# Patient Record
Sex: MALE | Race: WHITE | NOT HISPANIC OR LATINO | Employment: FULL TIME | URBAN - METROPOLITAN AREA
[De-identification: names, ages, dates, MRNs, and addresses within clinical notes are randomized per-mention and may not be internally consistent; named-entity substitution may affect disease eponyms.]

---

## 2024-05-28 ENCOUNTER — ANCILLARY PROCEDURE (OUTPATIENT)
Dept: RADIOLOGY | Facility: CLINIC | Age: 34
End: 2024-05-28
Payer: COMMERCIAL

## 2024-05-28 ENCOUNTER — NURSE TRIAGE (OUTPATIENT)
Dept: FAMILY MEDICINE | Facility: CLINIC | Age: 34
End: 2024-05-28

## 2024-05-28 DIAGNOSIS — R69 DIAGNOSIS UNKNOWN: Primary | ICD-10-CM

## 2024-05-28 NOTE — TELEPHONE ENCOUNTER
"Pt calling because early this morning around 1-2am he was having sever left flank pain.  States that he was on the floor with pain will 5am when it went away.  States that he took tylenol around 2am but did not really help.  Pt states that he has been talking to his MD friends and believe that it is a kidney stone.    Pt wants to get in today to see a provider for imaging.    Relayed to pt that there are no in clinics at ,  or  today.  Pt understands and states that he will most likely walk in to .    Pt said he will call back to schedule a visit is needed.    Understands to be seen in the ED of the severe pain returns.        Reason for Disposition   Patient wants to be seen    Additional Information   Negative: Passed out (i.e., lost consciousness, collapsed and was not responding)   Negative: Shock suspected (e.g., cold/pale/clammy skin, too weak to stand, low BP, rapid pulse)   Negative: Sounds like a life-threatening emergency to the triager   Negative: Followed a major injury to the back (e.g., MVA, fall > 10 feet or 3 meters, penetrating injury, etc.)   Negative: Upper, mid or lower back pain that occurs mainly in the midline   Negative: SEVERE pain (e.g., excruciating, scale 8-10) and present > 1 hour   Negative: Sudden onset of severe flank pain and age > 60 years   Negative: Abdominal pain and age > 60 years   Negative: Unable to urinate (or only a few drops) > 4 hours and bladder feels very full (e.g., palpable bladder or strong urge to urinate)   Negative: Pain radiates into groin, scrotum   Negative: Blood in urine (red, pink, or tea-colored)   Negative: Vomiting   Negative: Weakness of a leg or foot (e.g., unable to bear weight, dragging foot)   Negative: Patient sounds very sick or weak to the triager   Negative: Fever > 100.4 F (38.0 C)   Negative: Pain or burning with passing urine (urination)    Answer Assessment - Initial Assessment Questions  1. LOCATION: \"Where does it hurt?\" (e.g., " "left, right)      Left side  2. ONSET: \"When did the pain start?\"      Very early this morning around 1-2am.  3. SEVERITY: \"How bad is the pain?\" (e.g., Scale 1-10; mild, moderate, or severe)    - MILD (1-3): doesn't interfere with normal activities     - MODERATE (4-7): interferes with normal activities or awakens from sleep     - SEVERE (8-10): excruciating pain and patient unable to do normal activities (stays in bed)        Was severe. Laying on the floor.   4. PATTERN: \"Does the pain come and go, or is it constant?\"       Pt is currently gone since 5am.   5. CAUSE: \"What do you think is causing the pain?\"      Thinks it is a kidney stone  6. OTHER SYMPTOMS:  \"Do you have any other symptoms?\" (e.g., fever, abdomen pain, vomiting, leg weakness, burning with urination, blood in urine)      None. Currently feels fine.  7. PREGNANCY:  \"Is there any chance you are pregnant?\" \"When was your last menstrual period?\"      *No Answer*    Protocols used: Flank Pain-A-[facility]        JOHNATHAN PoolN, RN  [facility]    "

## 2024-05-29 ENCOUNTER — VIRTUAL VISIT (OUTPATIENT)
Dept: UROLOGY | Facility: CLINIC | Age: 34
End: 2024-05-29
Payer: COMMERCIAL

## 2024-05-29 ENCOUNTER — TELEPHONE (OUTPATIENT)
Dept: UROLOGY | Facility: CLINIC | Age: 34
End: 2024-05-29

## 2024-05-29 DIAGNOSIS — N20.1 LEFT URETERAL STONE: Primary | ICD-10-CM

## 2024-05-29 DIAGNOSIS — N20.0 NEPHROLITHIASIS: ICD-10-CM

## 2024-05-29 DIAGNOSIS — N20.1 CALCULUS OF URETEROVESICAL JUNCTION (UVJ): ICD-10-CM

## 2024-05-29 PROCEDURE — 99443 PR PHYSICIAN TELEPHONE EVALUATION 21-30 MIN: CPT | Performed by: NURSE PRACTITIONER

## 2024-05-29 RX ORDER — TROSPIUM CHLORIDE 20 MG/1
20 TABLET, FILM COATED ORAL 2 TIMES DAILY
Qty: 30 TABLET | Refills: 3 | Status: SHIPPED | OUTPATIENT
Start: 2024-05-29

## 2024-05-29 RX ORDER — OXYCODONE HYDROCHLORIDE 5 MG/1
5 TABLET ORAL
COMMUNITY
Start: 2024-05-28

## 2024-05-29 RX ORDER — TAMSULOSIN HYDROCHLORIDE 0.4 MG/1
0.4 CAPSULE ORAL DAILY
Qty: 30 CAPSULE | Refills: 0 | Status: SHIPPED | OUTPATIENT
Start: 2024-05-29

## 2024-05-29 RX ORDER — TROSPIUM CHLORIDE 20 MG/1
20 TABLET, FILM COATED ORAL
COMMUNITY
Start: 2024-02-22 | End: 2024-05-29

## 2024-05-29 RX ORDER — TAMSULOSIN HYDROCHLORIDE 0.4 MG/1
0.4 CAPSULE ORAL
COMMUNITY
Start: 2024-05-28 | End: 2024-06-11

## 2024-05-29 RX ORDER — OXYBUTYNIN CHLORIDE 5 MG/1
5 TABLET, EXTENDED RELEASE ORAL DAILY
Qty: 30 TABLET | Refills: 0 | Status: SHIPPED | OUTPATIENT
Start: 2024-05-29

## 2024-05-29 RX ORDER — ONDANSETRON 4 MG/1
4 TABLET, ORALLY DISINTEGRATING ORAL
COMMUNITY
Start: 2024-05-28

## 2024-05-29 NOTE — PROGRESS NOTES
Urology Telephonic Office Visit    Telephonic-Visit Details    Type of service:  Telephonic Visit    Telephonic Start Time: 1308    Telephonic End Time: 1324    Originating Location (pt. Location): Home    Distant Location (provider location):  Off-site     Platform used for Telephonic Visit: [employer]           Assessment and Plan:     Assessment:33 year old male with a left 3mm UVJ stone    Plan:  -Reviewed CT scan with patient. Noted left 3mm UVJ stone. Noted nonobstructing right renal stones.   -The patient and I discussed the diagnosis and natural history of urolithiasis. Discussed future visit for stone prevention management.   -We discussed treatment options including observation with MET vs definitve stone procedure. Pt would like to do MET x 3-4 weeks.   -Please use acetaminophen, ibuprofen, dimenhydrinate, and oxycodone PRN for pain control. Please continue with tamsulosin 0.4mg PO daily to help with stone passage. Will start on oxybutynin to help with bladder spasms. Discussed r/b/a of medication with patient.   -If having severe flank pain, fevers, chills, nausea, or vomiting please notify Urology clinic or be seen in the ER.   -RTC in 4 weeks with CT scan.     Monet Harris CNP  Department of Urology  May 29, 2024    I spent a total of 35 minutes spent on the date of the encounter doing chart review, history and exam, documentation, and further activities as noted above.          Chief Complaint:   Left UVJ Stone         History of Present Illness:    Bakari Helms is a pleasant 33 year old male who presents with concerns of a left UVJ stone.     Mr. Helms was [facility] with [facility]24 for concerns of left flank pain with urinary hesitancy and gross hematuria.     CT scan performed on 5/28/24 (images personally reviewed) revealed a left 3mm UVJ stone. Noted right nonobstructing renal stones. No noted hydronephrosis.     He is doing well at this time. Denies any f/c/n/v, gross  hematuria, or dysuria.     This is his first kidney stone. No known family history of nephrolithiasis.     Stone Risk Factors: None    He notes he has a history of an overactive bladder and will use trospium to help with his symptom. He notes he has had a bladder scan in the past which didn't reveal concerns of urinary retention.     He works as a  and currently is in a court at this time. Notes poor fluid intake with poor diet consisting of high sodium snacks.         Past Medical History:   No past medical history on file.         Past Surgical History:   No past surgical history on file.         Medications     Current Outpatient Medications   Medication Sig Dispense Refill    ondansetron (ZOFRAN ODT) 4 MG ODT tab Place 4 mg under the tongue      oxyCODONE (ROXICODONE) 5 MG tablet Take 5 mg by mouth      tamsulosin (FLOMAX) 0.4 MG capsule Take 0.4 mg by mouth      trospium (SANCTURA) 20 MG tablet Take 20 mg by mouth       No current facility-administered medications for this visit.            Family History:   No family history on file.         Social History:     Social History     Socioeconomic History    Marital status:      Spouse name: Not on file    Number of children: Not on file    Years of education: Not on file    Highest education level: Not on file   Occupational History    Not on file   Tobacco Use    Smoking status: Not on file    Smokeless tobacco: Not on file   Substance and Sexual Activity    Alcohol use: Not on file    Drug use: Not on file    Sexual activity: Not on file   Other Topics Concern    Not on file   Social History Narrative    Not on file     Social Determinants of Health     Financial Resource Strain: Not on file   Food Insecurity: Not on file   Transportation Needs: Not on file   Physical Activity: Not on file   Stress: Not on file   Social Connections: Not on file   Interpersonal Safety: Not on file   Housing Stability: Not on file            Allergies:   Patient has  no known allergies.         Review of Systems:  From intake questionnaire   Negative 14 system review except as noted on HPI, nurse's note.         Physical Exam:   General Appearance: Well groomed, hygenic  Eyes: No redness, discharge  Respiratory: No cough, no respiratory distress or labored breathing  Musculoskeletal: Grossly normal, full range of motion in upper extremities, no gross deficits  Skin: No discoloration or apparent rashes  Neurologic - No tremors  Psychiatric - Alert and oriented  The rest of a comprehensive physical examination is deferred due to video visit restrictions        Labs:    I personally reviewed all applicable laboratory data and went over findings with patient  Significant for:    UA RESULTS:        [employer] [facility]  Outside Information         suggestion  Information displayed in this report may not trend or trigger automated decision support.       Contains abnormal data UA W/ SEDIMENT EXAM REFLEXED PER CRITERIA  Order: [id number]  Component  Ref Range & Units 1 d ago   COLOR                      Yellow Color Yellow   CLARITY                    Clear Clarity Clear   SPECIFIC GRAVITY,URINE    1.010, 1.015, 1.020, 1.025                 1.025   PH,URINE                  6.0, 7.0, 8.0, 5.5, 6.5, 7.5, 8.5                 7.0   UROBILINOGEN,QUALITATIVE  Normal EU/dl Normal   PROTEIN, URINE  Negative mg/dL Trace Abnormal    GLUCOSE, URINE  Negative mg/dL Negative   KETONES,URINE              Negative mg/dL Negative   BILIRUBIN,URINE            Negative                 Negative   OCCULT BLOOD,URINE        Negative                 Moderate Abnormal    NITRITE                    Negative                 Negative   LEUKOCYTE ESTERASE        Negative                 Negative   [facility]     Specimen Collected: 05/28/24 12:34 PM    Performed by: [facility] Last Resulted: 05/28/24 12:40 PM   [facility]  [facility]  Result Received: 05/29/24  8:10 AM    View Encounter          Received Information      Result Report    UA W/ SEDIMENT EXAM REFLEXED PER CRITERIA (Order #[id number]) on 5/28/24       Order Report    UA W/ SEDIMENT EXAM REFLEXED PER CRITERIA (Order #[id number]) on 5/28/24    Reprint Order Requisition    UA W/ SEDIMENT EXAM REFLEXED PER CRITERIA (Order #[id number]) on 5/28/24      Tracking Links    Cosign Tracking Order Transmittal Tracking          [employer] [facility]  Outside Information         suggestion  Information displayed in this report may not trend or trigger automated decision support.       Contains abnormal data URINALYSIS MICROSCOPIC  Order: [id number]  Component  Ref Range & Units 1 d ago   RBC  0-2, None Seen /HPF 11-25 Abnormal    WBC  0-2, 3-5, None Seen /HPF 0-2   BACTERIA                  None Seen, Few Bacteria/HPF None Seen   EPITHELIAL CELLS          None Seen, Few Epi/HPF None Seen   OTHER Mucus Present   Resulting Agency [facility]     Specimen Collected: 05/28/24 12:34 PM    Performed by: [facility] Last Resulted: 05/28/24 12:55 PM   [facility]  Result Received: 05/29/24  8:10 AM    View Encounter          Received Information      Result Report    URINALYSIS MICROSCOPIC (Order #[id number]) on 5/28/24       Order Report    URINALYSIS MICROSCOPIC (Order #[id number]) on 5/28/24    Reprint Order Requisition    URINALYSIS MICROSCOPIC (Order #[id number]) on 5/28/24      Tracking Links    Cosign Tracking Order Transmittal Tracking       Imaging:    I personally reviewed all applicable imaging and went over the below findings with patient.    CT Abdomen Pelvis w/o Contrast  Order: [id number]  Impression      1.  Radiopaque 3 mm calculus distal left ureter. No associated hydronephrosis or hydroureter.  2.  Three additional nonobstructing calculi in right renal calyces.  Narrative    For  Patients: As a result of the 21st Century Cures Act, medical imaging exams and procedure reports are released immediately into your electronic medical record. You may view this report before your referring provider. If you have questions, please contact your health care provider.    EXAM: CT ABDOMEN PELVIS STONE PROTOCOL WO  LOCATION: [facility] [place]  DATE: 5/28/2024    INDICATION: Left flank pain, kidney stone suspected  COMPARISON: None.  TECHNIQUE: CT scan of the abdomen and pelvis was performed without oral or IV contrast. Multiplanar reformats were obtained. Dose reduction techniques were used.  CONTRAST: None.    FINDINGS:  LOWER CHEST: Normal.    HEPATOBILIARY: Normal.    PANCREAS: Normal.    SPLEEN: Normal.    ADRENAL GLANDS: Normal.    KIDNEY/BLADDER: Three small radiopaque calculi are present in the right cortical medullary junction with the 2 larger calculi measuring 2-3 mm. No left nephrolithiasis. There is a 3 mm radiopaque calculi in the distal left ureter within a centimeter of the ureterovesicular junction. There is no left hydronephrosis or hydroureter. Urinary bladder is decompressed.    BOWEL: No obstruction or inflammatory change. There is a circumscribed focus of increased attenuation including a small calcification in the pericolonic fat in the posterior left pelvis, likely related to remote prior instance of fat necrosis.    LYMPH NODES: Normal.    VASCULATURE: Normal.    PELVIC ORGANS: Prostate gland is normal in size. Seminal vesicles are symmetric. No pelvic free fluid.    MUSCULOSKELETAL: Normal.  Exam End: 05/28/24 12:58 PM    Specimen Collected: 05/28/24 12:58 PM Last Resulted: 05/28/24  1:20 PM   [facility]: [employer] [facility]  Result Received: 05/29/24  8:10 AM

## 2024-05-29 NOTE — PATIENT INSTRUCTIONS
UROLOGY CLINIC VISIT PATIENT INSTRUCTIONS    -If having severe flank pain, fevers, chills, nausea, or vomiting please notify Urology clinic or be seen in the ER.     If you have any issues, questions or concerns in the meantime, do not hesitate to contact us at [facility] at [phone] or via [employer].     Monet Harris CNP  Department of Urology

## 2024-06-18 ENCOUNTER — VIRTUAL VISIT (OUTPATIENT)
Dept: UROLOGY | Facility: CLINIC | Age: 34
End: 2024-06-18
Payer: COMMERCIAL

## 2024-06-18 DIAGNOSIS — N20.0 NEPHROLITHIASIS: Primary | ICD-10-CM

## 2024-06-18 PROCEDURE — 99203 OFFICE O/P NEW LOW 30 MIN: CPT | Mod: 95 | Performed by: NURSE PRACTITIONER

## 2024-06-18 ASSESSMENT — PAIN SCALES - GENERAL: PAINLEVEL: NO PAIN (0)

## 2024-06-18 NOTE — PATIENT INSTRUCTIONS
"UROLOGY CLINIC VISIT PATIENT INSTRUCTIONS    If you have any issues, questions or concerns in the meantime, do not hesitate to contact us at [facility] at [phone] or via [employer].     Monet Harris CNP  Department of Urology     DIET & LIFESTYLE CHANGES FOR PATIENTS WITH KIDNEY STONES    If you've had a kidney stone, you are likely to form another. Risk of recurrence is 15% at 1 year, 35% to 40% at 2 years, and 50% at 10 years. Therefore, prevention is very important.  These recommendations have shown to be effective.    CALCIUM STONES (Oxalate and Phosphate)    Fluid intake is the most important prevention measure to help prevent stones. Fluid intake should be at least 2.5 liters per day or 90-120oz per day. With goal of urine output of >2.5L per day.   Increasing liquids that have citric acid may help such as low calorie orange juice, lemonade (Crystal Light Lemonade or True Lemon/Lime), or adding a citrus to your water.  You can add lemon juice or fresh pete to your water daily.  Lemon juice increases the citrate in your urine, and helps decrease the formation of stone and even breakdown certain types of stones. Add a cap full/teaspoon of pure lemon juice to each glass.   Try to limit sugar, especially if you have diabetes.    Helpful Fluid Measurements:  1 liter = 34oz  1 quart = 32 oz  24 pack water: Each bottle 16.9 oz     Low Oxalate Diet: Limit your consumption of OXALATE-rich foods including:  All nuts and nut products including peanuts, almonds,peanut butter, almond milk  Spinach  Rhubarb  Beets  Chocolate  Soybeans and soy products   Wheat Germ    Website:   www.kidneysViryd Technologiesediet.com  Below is a link to a PDF that is based on Veset research. Please stick to pages 6-9 of this document. My suggestion is to review the list of food that is OK. The \"avoid\" list can be " overwhelming.  [email or web address]_[email or web address]_[email or web address]_eid=[email or web address]        Low Sodium Diet: Salt (sodium chloride) is found in abundance in many foods. High sodium levels in the urine can interfere with the kidney's handling of calcium.   Trying a DASH (Dietary Approaches to Stop Hypertension) diet which is eating more fruits and vegetables, limiting salt intake, moderate in low-fat diary products, and low in animal protein.   Try to decrease salt intake to <2000 mg of sodium daily.     Tips for reducing the salt in your diet:  Don't use salt at the table  Reduce the salt used in food preparation. Try 1/2 teaspoon when recipes call for 1 teaspoon.  Use herbs and spices for flavoring instead of salt.  Avoid salty foods.  Check the label before you buy or use a product. Note sodium and portion size information.  Try to consume less than 2,000 mg/day. (1 teaspoon = 2,000 mg)    Foods with high sodium content include:  Processed meat (including luncheon meats, sausage)   Crackers   Instant cereal   Processed cheese   Canned soups   Chips and snack foods   Soy sauce    Low Animal Protein: Reduce animal protein (meat) intake to no more than 6-8 ounces or less than 50 grams per day.     Maintain a normal calcium diet: Calcium rich foods are encouraged, but no more than 1000 - 1200 mg per day. Researches have found that people with low calcium intakes tend to have more stones. Foods with high calcium content are acceptable and include:  Calcium rich foods include:   Diary (cheese, milk, and yogurt)  Enriched cereals  Meat and fish  Dark green vegetables    Limit Vitamin C intake to < 1000 mg daily.    Consultation with a dietician may be helpful as well.  Please let our staff know if you are interested in this helpful option so a consult may be placed for you.

## 2024-06-18 NOTE — PROGRESS NOTES
Urology Telephonic Office Visit    Telephonic-Visit Details    Type of service:  Telephonic Visit    Telephonic Start Time: 0956    Telephonic End Time: 1015    Originating Location (pt. Location): Home    Distant Location (provider location):  Off-site     Platform used for Telephonic Visit: [facility]           Assessment and Plan:     Assessment:33 year old male with nephrolithiasis    Plan:  -Discussed option of repeat imaging to ensure stone passage. Pt deferred at this time.   -Discussed option of 24 hour urine study for further evaluation for risks of stones. Pt amenable to plan of care. Will send litholink kit x 1.   -We discussed some general measures to prevent recurrent kidney stones.  These include fluid intake to achieve 2.5 liters of urine per day, decreased salt intake, normal calcium intake, lowering animal protein intake, avoiding high amounts of oxalate containing foods, and increased citrate intake with orange juice/lemonade.   -Discussed option of further evaluation of LUTS with possible need bladder scan/urodynamic studies/PFPT. Will continue on trospium at this time. If symptoms worsen or become more bothersome, recommend to notify Urology clinic.   -RTC in 6-8 weeks to review 24 hour urine study.      Monet Harris CNP  Department of Urology  June 18, 2024    I spent a total of 25 minutes spent on the date of the encounter doing chart review, history and exam, documentation, and further activities as noted above.          Chief Complaint:   Left Ureteral Stone         History of Present Illness:    Bakari Helms is a pleasant 33 year old male who presents for follow up of a left ureteral stone.     He was able to pass his stone on his own. He was unable to retrieve his stone. He notes with passage he noted possibly two stones vs one.     He is doing well at this time. Denies any s/s of an acute stone.     He was initially seen with [facility] on 5/28/24 for concerns of left flank  pain with urinary hesitancy and gross hematuria.      CT scan performed on 5/28/24 (images personally reviewed) revealed a left 3mm UVJ stone. Noted two right  2-3mm nonobstructing renal stones. No noted hydronephrosis.      This was his first kidney stone episode. No known family history of nephrolithiasis.      Stone Risk Factors: None     He notes he has a history of an overactive bladder and will use trospium to help with his symptom. He notes he has had a bladder scan in the past which didn't reveal concerns of urinary retention.     He works as a  and currently is in a court at this time. Notes poor fluid intake with poor diet consisting of high sodium snacks for last 6 months    Does drink chocolate whey protein shakes one time per day.         Past Medical History:   No past medical history on file.         Past Surgical History:   No past surgical history on file.         Medications     Current Outpatient Medications   Medication Sig Dispense Refill    ondansetron (ZOFRAN ODT) 4 MG ODT tab Place 4 mg under the tongue      oxyBUTYnin ER (DITROPAN XL) 5 MG 24 hr tablet Take 1 tablet (5 mg) by mouth daily 30 tablet 0    oxyCODONE (ROXICODONE) 5 MG tablet Take 5 mg by mouth      tamsulosin (FLOMAX) 0.4 MG capsule Take 1 capsule (0.4 mg) by mouth daily 30 capsule 0    trospium (SANCTURA) 20 MG tablet Take 1 tablet (20 mg) by mouth 2 times daily 30 tablet 3     No current facility-administered medications for this visit.            Family History:   No family history on file.         Social History:     Social History     Socioeconomic History    Marital status:      Spouse name: Not on file    Number of children: Not on file    Years of education: Not on file    Highest education level: Not on file   Occupational History    Not on file   Tobacco Use    Smoking status: Not on file    Smokeless tobacco: Not on file   Substance and Sexual Activity    Alcohol use: Not on file    Drug use: Not on file     Sexual activity: Not on file   Other Topics Concern    Not on file   Social History Narrative    Not on file     Social Determinants of Health     Financial Resource Strain: Not on file   Food Insecurity: Not on file   Transportation Needs: Not on file   Physical Activity: Not on file   Stress: Not on file   Social Connections: Not on file   Interpersonal Safety: Not on file   Housing Stability: Not on file            Allergies:   Patient has no known allergies.         Review of Systems:  From intake questionnaire   Negative 14 system review except as noted on HPI, nurse's note.         Physical Exam:     The rest of a comprehensive physical examination is deferred due to telephonic visit restrictions      Imaging:    I personally reviewed all applicable imaging and went over the below findings with patient.    CT Abdomen Pelvis w/o Contrast  Order: [id number]  Impression      1.  Radiopaque 3 mm calculus distal left ureter. No associated hydronephrosis or hydroureter.  2.  Three additional nonobstructing calculi in right renal calyces.  Narrative    For Patients: As a result of the 21st Century Cures Act, medical imaging exams and procedure reports are released immediately into your electronic medical record. You may view this report before your referring provider. If you have questions, please contact your health care provider.    EXAM: CT ABDOMEN PELVIS STONE PROTOCOL WO  LOCATION: [facility] [place]  DATE: 5/28/2024    INDICATION: Left flank pain, kidney stone suspected  COMPARISON: None.  TECHNIQUE: CT scan of the abdomen and pelvis was performed without oral or IV contrast. Multiplanar reformats were obtained. Dose reduction techniques were used.  CONTRAST: None.    FINDINGS:  LOWER CHEST: Normal.    HEPATOBILIARY: Normal.    PANCREAS: Normal.    SPLEEN: Normal.    ADRENAL GLANDS: Normal.    KIDNEY/BLADDER: Three small radiopaque calculi are present in the right cortical medullary junction with the 2  larger calculi measuring 2-3 mm. No left nephrolithiasis. There is a 3 mm radiopaque calculi in the distal left ureter within a centimeter of the ureterovesicular junction. There is no left hydronephrosis or hydroureter. Urinary bladder is decompressed.    BOWEL: No obstruction or inflammatory change. There is a circumscribed focus of increased attenuation including a small calcification in the pericolonic fat in the posterior left pelvis, likely related to remote prior instance of fat necrosis.    LYMPH NODES: Normal.    VASCULATURE: Normal.    PELVIC ORGANS: Prostate gland is normal in size. Seminal vesicles are symmetric. No pelvic free fluid.    MUSCULOSKELETAL: Normal.  Exam End: 05/28/24 12:58 PM    Specimen Collected: 05/28/24 12:58 PM Last Resulted: 05/28/24  1:20 PM

## 2024-06-24 ENCOUNTER — TELEPHONE (OUTPATIENT)
Dept: UROLOGY | Facility: CLINIC | Age: 34
End: 2024-06-24
Payer: COMMERCIAL

## 2024-08-05 ENCOUNTER — TELEPHONE (OUTPATIENT)
Dept: UROLOGY | Facility: CLINIC | Age: 34
End: 2024-08-05
Payer: COMMERCIAL

## 2024-08-21 ENCOUNTER — TELEPHONE (OUTPATIENT)
Dept: UROLOGY | Facility: CLINIC | Age: 34
End: 2024-08-21
Payer: COMMERCIAL

## 2024-12-14 ENCOUNTER — MYC REFILL (OUTPATIENT)
Dept: UROLOGY | Facility: CLINIC | Age: 34
End: 2024-12-14
Payer: COMMERCIAL

## 2024-12-14 DIAGNOSIS — N20.1 LEFT URETERAL STONE: ICD-10-CM

## 2024-12-15 ENCOUNTER — HEALTH MAINTENANCE LETTER (OUTPATIENT)
Age: 34
End: 2024-12-15

## 2024-12-16 RX ORDER — TROSPIUM CHLORIDE 20 MG/1
20 TABLET, FILM COATED ORAL 2 TIMES DAILY
Qty: 30 TABLET | Refills: 3 | Status: SHIPPED | OUTPATIENT
Start: 2024-12-16

## 2025-01-28 ENCOUNTER — OFFICE VISIT (OUTPATIENT)
Dept: FAMILY MEDICINE | Facility: CLINIC | Age: 35
End: 2025-01-28
Payer: COMMERCIAL

## 2025-01-28 VITALS
OXYGEN SATURATION: 96 % | DIASTOLIC BLOOD PRESSURE: 81 MMHG | RESPIRATION RATE: 14 BRPM | BODY MASS INDEX: 22.12 KG/M2 | SYSTOLIC BLOOD PRESSURE: 135 MMHG | TEMPERATURE: 97.2 F | HEART RATE: 84 BPM | HEIGHT: 71 IN | WEIGHT: 158 LBS

## 2025-01-28 DIAGNOSIS — Z13.1 DIABETES MELLITUS SCREENING: ICD-10-CM

## 2025-01-28 DIAGNOSIS — B00.1 RECURRENT COLD SORES: ICD-10-CM

## 2025-01-28 DIAGNOSIS — L30.9 DERMATITIS: ICD-10-CM

## 2025-01-28 DIAGNOSIS — Z13.220 SCREENING CHOLESTEROL LEVEL: ICD-10-CM

## 2025-01-28 DIAGNOSIS — Z01.00 VISIT FOR EYE AND VISION EXAM: ICD-10-CM

## 2025-01-28 DIAGNOSIS — Z00.00 ROUTINE GENERAL MEDICAL EXAMINATION AT A HEALTH CARE FACILITY: Primary | ICD-10-CM

## 2025-01-28 DIAGNOSIS — Z86.018 HISTORY OF DYSPLASTIC NEVUS: ICD-10-CM

## 2025-01-28 PROBLEM — N20.0 KIDNEY STONE: Status: ACTIVE | Noted: 2025-01-28

## 2025-01-28 LAB
ERYTHROCYTE [DISTWIDTH] IN BLOOD BY AUTOMATED COUNT: 11.9 % (ref 10–15)
HCT VFR BLD AUTO: 46.5 % (ref 40–53)
HGB BLD-MCNC: 16.3 G/DL (ref 13.3–17.7)
MCH RBC QN AUTO: 30 PG (ref 26.5–33)
MCHC RBC AUTO-ENTMCNC: 35.1 G/DL (ref 31.5–36.5)
MCV RBC AUTO: 86 FL (ref 78–100)
PLATELET # BLD AUTO: 248 10E3/UL (ref 150–450)
RBC # BLD AUTO: 5.43 10E6/UL (ref 4.4–5.9)
WBC # BLD AUTO: 9.1 10E3/UL (ref 4–11)

## 2025-01-28 PROCEDURE — 80061 LIPID PANEL: CPT | Performed by: FAMILY MEDICINE

## 2025-01-28 PROCEDURE — 85027 COMPLETE CBC AUTOMATED: CPT | Performed by: FAMILY MEDICINE

## 2025-01-28 PROCEDURE — 99213 OFFICE O/P EST LOW 20 MIN: CPT | Mod: 25 | Performed by: FAMILY MEDICINE

## 2025-01-28 PROCEDURE — 99385 PREV VISIT NEW AGE 18-39: CPT | Performed by: FAMILY MEDICINE

## 2025-01-28 PROCEDURE — 36415 COLL VENOUS BLD VENIPUNCTURE: CPT | Performed by: FAMILY MEDICINE

## 2025-01-28 PROCEDURE — 82947 ASSAY GLUCOSE BLOOD QUANT: CPT | Performed by: FAMILY MEDICINE

## 2025-01-28 RX ORDER — TRIAMCINOLONE ACETONIDE 5 MG/G
OINTMENT TOPICAL
Qty: 45 G | Refills: 2 | Status: SHIPPED | OUTPATIENT
Start: 2025-01-28

## 2025-01-28 RX ORDER — ACYCLOVIR 400 MG/1
800 TABLET ORAL DAILY
Qty: 180 TABLET | Refills: 3 | Status: SHIPPED | OUTPATIENT
Start: 2025-01-28

## 2025-01-28 RX ORDER — KETOCONAZOLE 20 MG/ML
SHAMPOO, SUSPENSION TOPICAL DAILY PRN
Qty: 120 ML | Refills: 11 | Status: SHIPPED | OUTPATIENT
Start: 2025-01-28

## 2025-01-28 RX ORDER — PIMECROLIMUS 10 MG/G
CREAM TOPICAL 2 TIMES DAILY
Qty: 60 G | Refills: 2 | Status: SHIPPED | OUTPATIENT
Start: 2025-01-28

## 2025-01-28 SDOH — HEALTH STABILITY: PHYSICAL HEALTH: ON AVERAGE, HOW MANY DAYS PER WEEK DO YOU ENGAGE IN MODERATE TO STRENUOUS EXERCISE (LIKE A BRISK WALK)?: 3 DAYS

## 2025-01-28 ASSESSMENT — PAIN SCALES - GENERAL: PAINLEVEL_OUTOF10: NO PAIN (0)

## 2025-01-28 ASSESSMENT — SOCIAL DETERMINANTS OF HEALTH (SDOH): HOW OFTEN DO YOU GET TOGETHER WITH FRIENDS OR RELATIVES?: MORE THAN THREE TIMES A WEEK

## 2025-01-28 NOTE — PROGRESS NOTES
Preventive Care Visit  [facility]  Praveena Say Shaffer MD, Family Medicine  Jan 28, 2025      Assessment & Plan     Routine general medical examination at a health care facility  Routine health maintenance otherwise up-to-date.  Assistant   - fraud services.    History of dysplastic nevus  Strong family history of melanoma.  Patient had a regular dermatologic exam and has had some dysplastic nevi removed.  We will set him up with one of her dermatology providers.  - Adult Dermatology  Referral; Future    Visit for eye and vision exam  Had a close relative with ocular melanoma and wants to keep a good watch on his eyes.  - Adult Eye  Referral; Future    Screening cholesterol level    - Lipid panel reflex to direct LDL Fasting; Future    Diabetes mellitus screening    - Glucose; Future    Patient has been advised of split billing requirements and indicates understanding: Yes        Counseling  Appropriate preventive services were addressed with this patient via screening, questionnaire, or discussion as appropriate for fall prevention, nutrition, physical activity, Tobacco-use cessation, social engagement, weight loss and cognition.  Checklist reviewing preventive services available has been given to the patient.  Reviewed patient's diet, addressing concerns and/or questions.   He is at risk for lack of exercise and has been provided with information to increase physical activity for the benefit of his well-being.       Regular exercise  See Patient Instructions    Martita Mathews is a 34 year old, presenting for the following:  Physical        1/28/2025    11:28 AM   Additional Questions   Roomed by Noemi   Accompanied by self         1/28/2025    11:28 AM   Patient Reported Additional Medications   Patient reports taking the following new medications no          HPI  Here today for routine checkup and to establish care        Health Care  Directive  Patient does not have a Health Care Directive: Discussed advance care planning with patient; however, patient declined at this time.      1/28/2025   General Health   How would you rate your overall physical health? Excellent   Feel stress (tense, anxious, or unable to sleep) Not at all         1/28/2025   Nutrition   Three or more servings of calcium each day? Yes   Diet: Regular (no restrictions)   How many servings of fruit and vegetables per day? (!) 0-1   How many sweetened beverages each day? 0-1         1/28/2025   Exercise   Days per week of moderate/strenous exercise 3 days         1/28/2025   Social Factors   Frequency of gathering with friends or relatives More than three times a week   Worry food won't last until get money to buy more No   Food not last or not have enough money for food? No   Do you have housing? (Housing is defined as stable permanent housing and does not include staying ouside in a car, in a tent, in an abandoned building, in an overnight shelter, or couch-surfing.) Yes   Are you worried about losing your housing? No   Lack of transportation? No   Unable to get utilities (heat,electricity)? No         1/28/2025   Dental   Dentist two times every year? Yes         1/28/2025   TB Screening   Were you born outside of the [place]? No         Today's PHQ-2 Score:       1/28/2025    11:19 AM   PHQ-2 ( 1999 [employer])   Q1: Little interest or pleasure in doing things 0   Q2: Feeling down, depressed or hopeless 0   PHQ-2 Score 0    Q1: Little interest or pleasure in doing things Not at all   Q2: Feeling down, depressed or hopeless Not at all   PHQ-2 Score 0       Patient-reported           1/28/2025   Substance Use   Alcohol more than 3/day or more than 7/wk No   Do you use any other substances recreationally? No     Social History     Tobacco Use    Smoking status: Never     Passive exposure: Never    Smokeless tobacco: Never             1/28/2025   One time HIV Screening   Previous HIV  "test? Yes         1/28/2025   STI Screening   New sexual partner(s) since last STI/HIV test? No         1/28/2025   Contraception/Family Planning   Questions about contraception or family planning No        Reviewed and updated as needed this visit by Provider   Tobacco  Allergies  Meds  Problems  Med Hx  Surg Hx  Fam Hx            Lab work is in process  Labs reviewed in EPIC  BP Readings from Last 3 Encounters:   01/28/25 135/81    Wt Readings from Last 3 Encounters:   01/28/25 71.7 kg (158 lb)                      Review of Systems  CONSTITUTIONAL: NEGATIVE for fever, chills, change in weight  INTEGUMENTARY/SKIN: NEGATIVE for worrisome rashes, moles or lesions  EYES: NEGATIVE for vision changes or irritation  ENT/MOUTH: NEGATIVE for ear, mouth and throat problems  RESP: NEGATIVE for significant cough or SOB  BREAST: NEGATIVE for masses, tenderness or discharge  CV: NEGATIVE for chest pain, palpitations or peripheral edema  GI: NEGATIVE for nausea, abdominal pain, heartburn, or change in bowel habits  : NEGATIVE for frequency, dysuria, or hematuria  MUSCULOSKELETAL: NEGATIVE for significant arthralgias or myalgia  NEURO: NEGATIVE for weakness, dizziness or paresthesias  ENDOCRINE: NEGATIVE for temperature intolerance, skin/hair changes  HEME: NEGATIVE for bleeding problems  PSYCHIATRIC: NEGATIVE for changes in mood or affect     Objective    Exam  /81 (BP Location: Right arm, Patient Position: Sitting, Cuff Size: Adult Regular)   Pulse 84   Temp 97.2  F (36.2  C) (Temporal)   Resp 14   Ht 1.803 m (5' 11\")   Wt 71.7 kg (158 lb)   SpO2 96%   BMI 22.04 kg/m     Estimated body mass index is 22.04 kg/m  as calculated from the following:    Height as of this encounter: 1.803 m (5' 11\").    Weight as of this encounter: 71.7 kg (158 lb).    Physical Exam  GENERAL: alert and no distress  EYES: Eyes grossly normal to inspection, PERRL and conjunctivae and sclerae normal  HENT: ear canals and TM's " normal, nose and mouth without ulcers or lesions  NECK: no adenopathy, no asymmetry, masses, or scars  RESP: lungs clear to auscultation - no rales, rhonchi or wheezes  CV: regular rate and rhythm, normal S1 S2, no S3 or S4, no murmur, click or rub, no peripheral edema  ABDOMEN: soft, nontender, no hepatosplenomegaly, no masses and bowel sounds normal  MS: no gross musculoskeletal defects noted, no edema  SKIN: no suspicious lesions or rashes  NEURO: Normal strength and tone, mentation intact and speech normal  PSYCH: mentation appears normal, affect normal/bright        Signed Electronically by: Praveena Shaffer MD

## 2025-01-28 NOTE — LETTER
"January 29, 2025      Reid Helms  [address]  [place] [address]        Reid,   Your lab work all looks great.  That LDL (\"bad\" cholesterol) is actually not high in the slightest.  The goal for a young healthy person should be less than 130.  And you have a fantastic HDL (\"good\" cholesterol), which helps protect the heart.  So everything looks perfectly fine.   MIREILLE Shaffer M.D.     Resulted Orders   Glucose   Result Value Ref Range    Glucose 98 70 - 99 mg/dL    Patient Fasting > 8hrs? Yes    Lipid panel reflex to direct LDL Non-fasting   Result Value Ref Range    Cholesterol 192 <200 mg/dL    Triglycerides 128 <150 mg/dL    Direct Measure HDL 61 >=40 mg/dL    LDL Cholesterol Calculated 105 (H) <100 mg/dL    Non HDL Cholesterol 131 (H) <130 mg/dL    Patient Fasting > 8hrs? Yes     Narrative    Cholesterol  Desirable: < 200 mg/dL  Borderline High: 200 - 239 mg/dL  High: >= 240 mg/dL    Triglycerides  Normal: < 150 mg/dL  Borderline High: 150 - 199 mg/dL  High: 200-499 mg/dL  Very High: >= 500 mg/dL    Direct Measure HDL  Female: >= 50 mg/dL   Male: >= 40 mg/dL    LDL Cholesterol  Desirable: < 100 mg/dL  Above Desirable: 100 - 129 mg/dL   Borderline High: 130 - 159 mg/dL   High:  160 - 189 mg/dL   Very High: >= 190 mg/dL    Non HDL Cholesterol  Desirable: < 130 mg/dL  Above Desirable: 130 - 159 mg/dL  Borderline High: 160 - 189 mg/dL  High: 190 - 219 mg/dL  Very High: >= 220 mg/dL   CBC with platelets   Result Value Ref Range    WBC Count 9.1 4.0 - 11.0 10e3/uL    RBC Count 5.43 4.40 - 5.90 10e6/uL    Hemoglobin 16.3 13.3 - 17.7 g/dL    Hematocrit 46.5 40.0 - 53.0 %    MCV 86 78 - 100 fL    MCH 30.0 26.5 - 33.0 pg    MCHC 35.1 31.5 - 36.5 g/dL    RDW 11.9 10.0 - 15.0 %    Platelet Count 248 150 - 450 10e3/uL         "

## 2025-01-28 NOTE — PATIENT INSTRUCTIONS
Patient Education   Preventive Care Advice   This is general advice given by our system to help you stay healthy. However, your care team may have specific advice just for you. Please talk to your care team about your preventive care needs.  Nutrition  Eat 5 or more servings of fruits and vegetables each day.  Try wheat bread, brown rice and whole grain pasta (instead of white bread, rice, and pasta).  Get enough calcium and vitamin D. Check the label on foods and aim for 100% of the RDA (recommended daily allowance).  Lifestyle  Exercise at least 150 minutes each week  (30 minutes a day, 5 days a week).  Do muscle strengthening activities 2 days a week. These help control your weight and prevent disease.  No smoking.  Wear sunscreen to prevent skin cancer.  Have a dental exam and cleaning every 6 months.  Yearly exams  See your health care team every year to talk about:  Any changes in your health.  Any medicines your care team has prescribed.  Preventive care, family planning, and ways to prevent chronic diseases.  Shots (vaccines)   HPV shots (up to age 26), if you've never had them before.  Hepatitis B shots (up to age 59), if you've never had them before.  COVID-19 shot: Get this shot when it's due.  Flu shot: Get a flu shot every year.  Tetanus shot: Get a tetanus shot every 10 years.  Pneumococcal, hepatitis A, and RSV shots: Ask your care team if you need these based on your risk.  Shingles shot (for age 50 and up)  General health tests  Diabetes screening:  Starting at age 35, Get screened for diabetes at least every 3 years.  If you are younger than age 35, ask your care team if you should be screened for diabetes.  Cholesterol test: At age 39, start having a cholesterol test every 5 years, or more often if advised.  Bone density scan (DEXA): At age 50, ask your care team if you should have this scan for osteoporosis (brittle bones).  Hepatitis C: Get tested at least once in your life.  STIs (sexually  transmitted infections)  Before age 24: Ask your care team if you should be screened for STIs.  After age 24: Get screened for STIs if you're at risk. You are at risk for STIs (including HIV) if:  You are sexually active with more than one person.  You don't use condoms every time.  You or a partner was diagnosed with a sexually transmitted infection.  If you are at risk for HIV, ask about PrEP medicine to prevent HIV.  Get tested for HIV at least once in your life, whether you are at risk for HIV or not.  Cancer screening tests  Cervical cancer screening: If you have a cervix, begin getting regular cervical cancer screening tests starting at age 21.  Breast cancer scan (mammogram): If you've ever had breasts, begin having regular mammograms starting at age 40. This is a scan to check for breast cancer.  Colon cancer screening: It is important to start screening for colon cancer at age 45.  Have a colonoscopy test every 10 years (or more often if you're at risk) Or, ask your provider about stool tests like a FIT test every year or Cologuard test every 3 years.  To learn more about your testing options, visit:   .  For help making a decision, visit:   https://bit.ly/ac52104.  Prostate cancer screening test: If you have a prostate, ask your care team if a prostate cancer screening test (PSA) at age 55 is right for you.  Lung cancer screening: If you are a current or former smoker ages 50 to 80, ask your care team if ongoing lung cancer screenings are right for you.  For informational purposes only. Not to replace the advice of your health care provider. Copyright   2023 [employer]. All rights reserved. Clinically reviewed by the [facility] Transitions Program. [employer] [id number] - REV 01/24.

## 2025-01-29 LAB
CHOLEST SERPL-MCNC: 192 MG/DL
FASTING STATUS PATIENT QL REPORTED: YES
FASTING STATUS PATIENT QL REPORTED: YES
GLUCOSE SERPL-MCNC: 98 MG/DL (ref 70–99)
HDLC SERPL-MCNC: 61 MG/DL
LDLC SERPL CALC-MCNC: 105 MG/DL
NONHDLC SERPL-MCNC: 131 MG/DL
TRIGL SERPL-MCNC: 128 MG/DL

## 2025-01-29 NOTE — RESULT ENCOUNTER NOTE
"Reid,  Your lab work all looks great.  That LDL (\"bad\" cholesterol) is actually not high in the slightest.  The goal for a young healthy person should be less than 130.  And you have a fantastic HDL (\"good\" cholesterol), which helps protect the heart.  So everything looks perfectly fine.  MIREILLE Shaffer M.D. "

## 2025-04-02 ENCOUNTER — OFFICE VISIT (OUTPATIENT)
Dept: OPTOMETRY | Facility: CLINIC | Age: 35
End: 2025-04-02
Attending: FAMILY MEDICINE
Payer: COMMERCIAL

## 2025-04-02 DIAGNOSIS — H04.123 DRY EYE SYNDROME OF BOTH EYES: ICD-10-CM

## 2025-04-02 DIAGNOSIS — Z01.00 VISIT FOR EYE AND VISION EXAM: Primary | Chronic | ICD-10-CM

## 2025-04-02 DIAGNOSIS — Z80.8 FAMILY HISTORY OF MELANOMA: ICD-10-CM

## 2025-04-02 PROCEDURE — 92015 DETERMINE REFRACTIVE STATE: CPT | Performed by: OPTOMETRIST

## 2025-04-02 PROCEDURE — 92004 COMPRE OPH EXAM NEW PT 1/>: CPT | Performed by: OPTOMETRIST

## 2025-04-02 ASSESSMENT — REFRACTION_MANIFEST
OD_SPHERE: PLANO
OS_SPHERE: PLANO
METHOD_AUTOREFRACTION: 1

## 2025-04-02 ASSESSMENT — CONF VISUAL FIELD
OS_INFERIOR_TEMPORAL_RESTRICTION: 0
OD_SUPERIOR_NASAL_RESTRICTION: 0
OD_NORMAL: 1
OD_SUPERIOR_TEMPORAL_RESTRICTION: 0
OD_INFERIOR_NASAL_RESTRICTION: 0
OS_SUPERIOR_NASAL_RESTRICTION: 0
OS_SUPERIOR_TEMPORAL_RESTRICTION: 0
OD_INFERIOR_TEMPORAL_RESTRICTION: 0
OS_NORMAL: 1
OS_INFERIOR_NASAL_RESTRICTION: 0

## 2025-04-02 ASSESSMENT — CUP TO DISC RATIO
OS_RATIO: 0.2
OD_RATIO: 0.2

## 2025-04-02 ASSESSMENT — KERATOMETRY
OD_K2POWER_DIOPTERS: 45.25
OS_K1POWER_DIOPTERS: 45.00
OD_AXISANGLE2_DEGREES: 170
OD_K1POWER_DIOPTERS: 44.50
OS_K2POWER_DIOPTERS: 45.25
OS_AXISANGLE_DEGREES: 075
OS_AXISANGLE2_DEGREES: 165
OD_AXISANGLE_DEGREES: 080

## 2025-04-02 ASSESSMENT — EXTERNAL EXAM - RIGHT EYE: OD_EXAM: NORMAL

## 2025-04-02 ASSESSMENT — VISUAL ACUITY
OD_SC: 20/20
OS_SC: 20/20
OS_SC: 20/20
OD_SC: 20/20
METHOD: SNELLEN - LINEAR

## 2025-04-02 ASSESSMENT — SLIT LAMP EXAM - LIDS
COMMENTS: NORMAL
COMMENTS: NORMAL

## 2025-04-02 ASSESSMENT — EXTERNAL EXAM - LEFT EYE: OS_EXAM: NORMAL

## 2025-04-02 ASSESSMENT — TONOMETRY
IOP_METHOD: ICARE
OD_IOP_MMHG: 19.9
OS_IOP_MMHG: 21.9

## 2025-04-02 NOTE — LETTER
4/2/2025      Bakari Helms  [address]  [place] [address]      Dear Colleague,    Thank you for referring your patient, Bakari Helms, to the [facility]. Please see a copy of my visit note below.    Chief Complaint   Patient presents with     Annual Eye Exam     Check behind eye for cancer due to family history        Last Eye Exam: 5 years ago  Dilated Previously: Yes    What are you currently using to see?  does not use glasses or contacts       Distance Vision Acuity: Satisfied with vision    Near Vision Acuity: Satisfied with vision while reading  unaided    Eye Comfort: dry sometimes  Do you use eye drops? : Yes: otc like systane  Occupation or Hobbies:      Family history of ocular melanoma    Jenifer Jones Optometric Assistant, A.B.O.C.          Medical, surgical and family histories reviewed and updated 4/2/2025.       OBJECTIVE: See Ophthalmology exam    ASSESSMENT:    ICD-10-CM    1. Visit for eye and vision exam  Z01.00 Adult Eye  Referral     EYE EXAM (SIMPLE-NONBILLABLE)     REFRACTION      2. Family history of melanoma  Z80.8 EYE EXAM (SIMPLE-NONBILLABLE)      3. Dry eye syndrome of both eyes  H04.123           PLAN:     Patient Instructions   No glasses recommended.    Monitor retina with yearly eye exams or sooner if any changes in vision.    Artificial tears as needed.    Return in 1 year for a complete eye exam or sooner if needed.    Marek Burt, KRIS         Again, thank you for allowing me to participate in the care of your patient.        Sincerely,        Marek Burt, OD    Electronically signed

## 2025-04-02 NOTE — PATIENT INSTRUCTIONS
No glasses recommended.    Monitor retina with yearly eye exams or sooner if any changes in vision.    Artificial tears as needed.    Return in 1 year for a complete eye exam or sooner if needed.    Marek Burt, OD    The affects of the dilating drops last for 4- 6 hours.  You will be more sensitive to light and vision will be blurry up close.  Do not drive if you do not feel comfortable.  Mydriatic sunglasses were given if needed.    There is a combination of three treatments which can greatly improve symptoms of dry eyes.     Artificial tears  Heat (eyes closed)  Eyelid and eyelash cleansing (eyes closed)     Use one drop of artificial tears both eyes 4 x daily.  Once in the morning, lunch, dinner and bedtime. Continue to use the drops regardless if your eyes are comfortable or not.  Artificial tears work best as a preventative and not as well after your eyes are starting to bother you.  It may take 4- 6 weeks of using the drops before you notice improvement.  If after that time you are still having problems schedule an appointment for an evaluation and discussion of different treatments such as Restasis or Xiidra.  Dry eyes are a chronic condition and you may have more symptoms at certain times of the year.    Excess tearing can be due to the right tears not working properly or a blockage in the tear drainage system.  You can try using artificial tears 1 drop both eyes 4 x day.  If the excess tearing is bothersome after 4-6 weeks of treatment then we can send you for further testing.  This would entail a referral to our oculoplastic specialist Dr. Khoi Wooten at the [facility]-[phone].    Recommended brands are:    Systane Complete  Systane Ultra  Systane Balance  Refresh Advanced Optive  Refresh Relieva  Blink    Recommended brands for contact lens wearers are:    Systane contacts  Refresh contacts  Blink contacts    If you are using drops more than 4 x day or have sensitivities to preservatives I  recommend non preserved artificial tears.  These come in 1 use vials.  They can be used every 1-2 hours.  Do not reuse the vials.    Recommended brands are:    Refresh Optive Ethan-3  Systane- preservative free  Refresh-  preservative free  Blink- preservative free    Gels or ointment can be used at night.    Recommended brands are:    Systane Gel  Refresh Gel  Blink Gel  Genteal Gel    Systane night time (ointment)  Refresh Celluvisc  Refresh PM (ointment)    Optase dry eye spray.  Spray to eyelids 3-4 x daily.  This can be purchased on Amazon.      Visine, Clear Eyes or Murine (drops that get the red out) can irritate the eyes and cause a rebound effect where the eyes become more red and you end up using more drops.  Avoid drops containing tetrahydrozoline, naphazoline, phenylephrine, oxymetazoline.      OTC Lumify is a newer product that gives immediate redness relief without the rebound effect.  Use as needed to take the redness out.    Artificial tears may be used with other drops (such as allergy, glaucoma, antibiotics) around the same time.  Be sure to wait 5 minutes in between drops.    Heat to the eyelids can also improve your symptoms of dry eyes.  Aldair heat masks can be purchased at Amazon to be used nightly for 10-15 minutes.  Other options are gel masks that can be put in the microwave and purchased at most pharmacies.      Tea Tree Oil eyelid cleansers recommended are Ocusoft Oust foam cleanser to cleanse eyelids/lashes at night and in the am. Other options are Blephadex or Cliradex eyelid wipes.  KEEP EYES CLOSED when using these products.  These can be purchased on amazon.com   A good product for make up remover with tea tree oil is WeLoveEyes.  This can be found at www.RunMyProcess or [employer].    Other good eyelid cleansers have hypochlorous which removes excess bacteria and is safe around the eyes. Products are Avenova, Ocusoft Hypochlor or Heyedrate. Spray solution onto cotton pad, close  eyes and gently apply to eyelids and eyelashes using side to side motion.  You can also KEEP EYES CLOSED spray and rub into eyelashes.  You do not need to rinse it off. Use morning and evening. These products can be found on Amazon.  You can check with your local pharmacy and see if they can order if for you if they don't have it.    Other brands of eyelid cleansing wipes are:    Ocusoft wipes  Systane wipes    A great eye make up line is [email or web address]      Optometry Providers       Clinic Locations                                 Telephone Number   Dr. Jayne Ford    [facility]  [place] [phone]     [place] [facility] Hours:                [place] [facility] Hours:       [place] [facility] Hours:   [address]   [address] [address]     [address]  [place] [address]   [place] [address]    [place] [address]  Phone: [phone]                    Phone: [phone]     Phone: [phone]                      Monday 8:00-6:00                          Monday 8:00-6:00                          Monday 8:00-6:00              Tuesday 8:00-6:00                          Tuesday 8:00-6:00                          Tuesday 8:00-6:00              Wednesday 8:00-6:00                  Wednesday 8:00-6:00                   Wednesday 8:00-6:00      Thursday 8:00-6:00                        Thursday 8:00-6:00                         Thursday 8:00-6:00            Friday 8:00-5:00                              Friday 8:00-5:00                              Friday 8:00-5:00    [place] [facility] Hours:   [address] [place] [address]  [phone]    Monday 9:00-6:00  Tuesday 9:00-6:00  Wednesday 9:00-6:00  Thursday 9:00-6:00  Friday 9:00-5:00  As always, Thank you for trusting us with your  health care needs!

## 2025-04-07 ENCOUNTER — E-VISIT (OUTPATIENT)
Dept: URGENT CARE | Facility: CLINIC | Age: 35
End: 2025-04-07
Payer: COMMERCIAL

## 2025-04-07 DIAGNOSIS — R05.1 ACUTE COUGH: ICD-10-CM

## 2025-04-07 DIAGNOSIS — J01.90 ACUTE SINUSITIS WITH SYMPTOMS > 10 DAYS: Primary | ICD-10-CM

## 2025-04-08 RX ORDER — BENZONATATE 100 MG/1
100 CAPSULE ORAL 3 TIMES DAILY PRN
Qty: 30 CAPSULE | Refills: 0 | Status: SHIPPED | OUTPATIENT
Start: 2025-04-08

## 2025-04-08 NOTE — PATIENT INSTRUCTIONS
Acute Sinusitis: Care Instructions  Overview     Acute sinusitis is an inflammation of the mucous membranes inside the nose and sinuses. Sinuses are the hollow spaces in your skull around the eyes and nose. Acute sinusitis often follows a cold. Acute sinusitis causes thick, discolored mucus that drains from the nose or down the back of the throat. It also can cause pain and pressure in your head and face along with a stuffy or blocked nose.  In most cases, sinusitis gets better on its own in 1 to 2 weeks. But some mild symptoms may last for several weeks. Sometimes antibiotics are needed if there is a bacterial infection.  Follow-up care is a key part of your treatment and safety. Be sure to make and go to all appointments, and call your doctor if you are having problems. It's also a good idea to know your test results and keep a list of the medicines you take.  How can you care for yourself at home?  Use saline (saltwater) nasal washes. This can help keep your nasal passages open and wash out mucus and allergens.  You can buy saline nose washes at a grocery store or drugstore. Follow the instructions on the package.  You can make your own at home. Add 1 teaspoon of non-iodized salt and 1 teaspoon of baking soda to 2 cups of distilled or boiled and cooled water. Fill a squeeze bottle or a nasal cleansing pot (such as a neti pot) with the nasal wash. Then put the tip into your nostril, and lean over the sink. With your mouth open, gently squirt the liquid. Repeat on the other side.  Try a decongestant nasal spray like oxymetazoline (Afrin). Do not use it for more than 3 days in a row. Using it for more than 3 days can make your congestion worse.  If needed, take an over-the-counter pain medicine, such as acetaminophen (Tylenol), ibuprofen (Advil, Motrin), or naproxen (Aleve). Read and follow all instructions on the label.  If the doctor prescribed antibiotics, take them as directed. Do not stop taking them just  "because you feel better. You need to take the full course of antibiotics.  Be careful when taking over-the-counter cold or flu medicines and Tylenol at the same time. Many of these medicines have acetaminophen, which is Tylenol. Read the labels to make sure that you are not taking more than the recommended dose. Too much acetaminophen (Tylenol) can be harmful.  Try a steroid nasal spray. It may help with your symptoms.  Breathe warm, moist air. You can use a steamy shower, a hot bath, or a sink filled with hot water. Avoid cold, dry air. Using a humidifier in your home may help. Follow the directions for cleaning the machine.  When should you call for help?   Call your doctor now or seek immediate medical care if:    You have new or worse swelling, redness, or pain in your face or around one or both of your eyes.     You have double vision or a change in your vision.     You have a high fever.     You have a severe headache and a stiff neck.     You have mental changes, such as feeling confused or much less alert.   Watch closely for changes in your health, and be sure to contact your doctor if:    You are not getting better as expected.   Where can you learn more?  Go to https://www.[employer].net/patiented  Enter I933 in the search box to learn more about \"Acute Sinusitis: Care Instructions.\"  Current as of: October 27, 2024  Content Version: 14.4    5922-2748 [employer].   Care instructions adapted under license by your healthcare professional. If you have questions about a medical condition or this instruction, always ask your healthcare professional. [employer] disclaims any warranty or liability for your use of this information.    Dear Bakari Helms    After reviewing your responses, I've been able to diagnose you with sinusitis.      Based on your responses and diagnosis, I have prescribed Augmentin and Tessalon Pearls to treat your symptoms. I have sent this to your pharmacy.? "     It is also important to stay well hydrated, get lots of rest and take over-the-counter decongestants,?tylenol?or ibuprofen if you?are able to?take those medications per your primary care provider to help relieve discomfort.?     It is important that you take?all of?your prescribed medication even if your symptoms are improving after a few doses.? Taking?all of?your medicine helps prevent the symptoms from returning.?     If your symptoms worsen, you develop severe headache, vomiting, high fever (>102), or are not improving in 7 days, please contact your primary care provider for an appointment or visit any of our convenient Walk-in Care or Urgent Care Centers to be seen which can be found on our website?here.?     Thanks again for choosing?us?as your health care partner,?   ?  Annika Dowell, ISAAC?

## 2025-05-25 DIAGNOSIS — N20.1 LEFT URETERAL STONE: ICD-10-CM

## 2025-05-27 RX ORDER — TROSPIUM CHLORIDE 20 MG/1
TABLET, FILM COATED ORAL
Qty: 30 TABLET | Refills: 0 | OUTPATIENT
Start: 2025-05-27

## 2025-05-28 ENCOUNTER — MYC REFILL (OUTPATIENT)
Dept: UROLOGY | Facility: CLINIC | Age: 35
End: 2025-05-28
Payer: COMMERCIAL

## 2025-05-28 DIAGNOSIS — N20.1 LEFT URETERAL STONE: ICD-10-CM

## 2025-05-29 RX ORDER — TROSPIUM CHLORIDE 20 MG/1
20 TABLET, FILM COATED ORAL 2 TIMES DAILY
Qty: 60 TABLET | Refills: 0 | Status: SHIPPED | OUTPATIENT
Start: 2025-05-29

## 2025-07-08 DIAGNOSIS — N20.1 LEFT URETERAL STONE: ICD-10-CM

## 2025-07-10 RX ORDER — TROSPIUM CHLORIDE 20 MG/1
20 TABLET, FILM COATED ORAL 2 TIMES DAILY
Qty: 60 TABLET | Refills: 0 | Status: SHIPPED | OUTPATIENT
Start: 2025-07-10

## 2025-07-17 DIAGNOSIS — N20.1 LEFT URETERAL STONE: ICD-10-CM

## 2025-07-17 RX ORDER — TROSPIUM CHLORIDE 20 MG/1
20 TABLET, FILM COATED ORAL 2 TIMES DAILY
Qty: 60 TABLET | Refills: 0 | OUTPATIENT
Start: 2025-07-17

## 2025-07-21 DIAGNOSIS — N20.1 LEFT URETERAL STONE: ICD-10-CM

## 2025-07-22 RX ORDER — TROSPIUM CHLORIDE 20 MG/1
20 TABLET, FILM COATED ORAL 2 TIMES DAILY
Qty: 60 TABLET | Refills: 0 | OUTPATIENT
Start: 2025-07-22

## 2025-09-03 ENCOUNTER — OFFICE VISIT (OUTPATIENT)
Dept: DERMATOLOGY | Facility: CLINIC | Age: 35
End: 2025-09-03
Payer: COMMERCIAL

## 2025-09-03 DIAGNOSIS — D18.01 CHERRY ANGIOMA: ICD-10-CM

## 2025-09-03 DIAGNOSIS — D22.9 MULTIPLE MELANOCYTIC NEVI: ICD-10-CM

## 2025-09-03 DIAGNOSIS — L21.9 DERMATITIS, SEBORRHEIC: ICD-10-CM

## 2025-09-03 DIAGNOSIS — Z80.8 FAMILY HISTORY OF MELANOMA: ICD-10-CM

## 2025-09-03 DIAGNOSIS — L81.4 SOLAR LENTIGO: ICD-10-CM

## 2025-09-03 DIAGNOSIS — L30.9 CHRONIC DERMATITIS OF HANDS: ICD-10-CM

## 2025-09-03 DIAGNOSIS — Z86.018 HISTORY OF DYSPLASTIC NEVUS: Primary | ICD-10-CM

## 2025-09-03 PROCEDURE — 99204 OFFICE O/P NEW MOD 45 MIN: CPT | Performed by: DERMATOLOGY

## 2025-09-03 RX ORDER — TRIAMCINOLONE ACETONIDE 5 MG/G
1 OINTMENT TOPICAL 2 TIMES DAILY
Qty: 60 G | Refills: 11 | Status: SHIPPED | OUTPATIENT
Start: 2025-09-03

## 2025-09-03 RX ORDER — KETOCONAZOLE 20 MG/ML
SHAMPOO, SUSPENSION TOPICAL DAILY PRN
Qty: 120 ML | Refills: 11 | Status: SHIPPED | OUTPATIENT
Start: 2025-09-03

## 2025-09-03 RX ORDER — PIMECROLIMUS 10 MG/G
CREAM TOPICAL 2 TIMES DAILY
Qty: 60 G | Refills: 11 | Status: SHIPPED | OUTPATIENT
Start: 2025-09-03